# Patient Record
Sex: FEMALE | Race: BLACK OR AFRICAN AMERICAN | URBAN - METROPOLITAN AREA
[De-identification: names, ages, dates, MRNs, and addresses within clinical notes are randomized per-mention and may not be internally consistent; named-entity substitution may affect disease eponyms.]

---

## 2018-05-24 ENCOUNTER — TELEPHONE (OUTPATIENT)
Dept: OBGYN CLINIC | Facility: CLINIC | Age: 18
End: 2018-05-24

## 2018-05-24 NOTE — TELEPHONE ENCOUNTER
Patient is a new patient to us. I scheduled an appointment for June 11th. First Day of Last Period April 8th. She has a few questions concerning the pregnancy. Thank you.

## 2018-05-24 NOTE — TELEPHONE ENCOUNTER
Patient c/o episodes of nausea when eating. Patient encouraged to try small but frequent meals, avoid foods high in fat and spice food. Patient instructed to increase oral fluids as tolerated. Patient instructed to call office back if no improvement.  [place]

## 2018-06-11 ENCOUNTER — TELEPHONE (OUTPATIENT)
Dept: OBGYN CLINIC | Facility: CLINIC | Age: 18
End: 2018-06-11

## 2018-06-11 ENCOUNTER — OFFICE VISIT (OUTPATIENT)
Dept: OBGYN CLINIC | Facility: CLINIC | Age: 18
End: 2018-06-11

## 2018-06-11 ENCOUNTER — MED REC SCAN ONLY (OUTPATIENT)
Dept: OBGYN CLINIC | Facility: CLINIC | Age: 18
End: 2018-06-11

## 2018-06-11 ENCOUNTER — APPOINTMENT (OUTPATIENT)
Dept: OBGYN CLINIC | Facility: CLINIC | Age: 18
End: 2018-06-11

## 2018-06-11 VITALS — HEIGHT: 59 IN | DIASTOLIC BLOOD PRESSURE: 52 MMHG | SYSTOLIC BLOOD PRESSURE: 100 MMHG

## 2018-06-11 DIAGNOSIS — Z34.01 ENCOUNTER FOR SUPERVISION OF NORMAL FIRST PREGNANCY IN FIRST TRIMESTER: Primary | ICD-10-CM

## 2018-06-11 PROCEDURE — 87491 CHLMYD TRACH DNA AMP PROBE: CPT | Performed by: OBSTETRICS & GYNECOLOGY

## 2018-06-11 PROCEDURE — 87591 N.GONORRHOEAE DNA AMP PROB: CPT | Performed by: OBSTETRICS & GYNECOLOGY

## 2018-06-11 PROCEDURE — 81002 URINALYSIS NONAUTO W/O SCOPE: CPT | Performed by: OBSTETRICS & GYNECOLOGY

## 2018-06-11 PROCEDURE — 76801 OB US < 14 WKS SINGLE FETUS: CPT | Performed by: OBSTETRICS & GYNECOLOGY

## 2018-06-11 PROCEDURE — 80307 DRUG TEST PRSMV CHEM ANLYZR: CPT | Performed by: OBSTETRICS & GYNECOLOGY

## 2018-06-11 PROCEDURE — 87086 URINE CULTURE/COLONY COUNT: CPT | Performed by: OBSTETRICS & GYNECOLOGY

## 2018-06-11 NOTE — PROGRESS NOTES
Viable pregnancy at 9 weeks 0 days due date is January 14, 2019. Desires genetic testing. We will do nuchal translucency and Pap A. Blood work was discussed. 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 She does not smoke no alcohol no drugs.   She

## 2018-06-23 ENCOUNTER — TELEPHONE (OUTPATIENT)
Dept: OBGYN CLINIC | Facility: CLINIC | Age: 18
End: 2018-06-23

## 2018-06-23 NOTE — TELEPHONE ENCOUNTER
Referral for NT approved. Message send to  to schedule patient for [address] appointment.

## 2018-06-23 NOTE — TELEPHONE ENCOUNTER
Per pt she is experiencing nausea and is not able to eat a lot because of if. She would like some advise in what to do to help that situation. Please advise and call pt.  Thanks

## 2018-06-25 RX ORDER — DOXYLAMINE SUCCINATE AND PYRIDOXINE HYDROCHLORIDE, DELAYED RELEASE TABLETS 10 MG/10 MG 10; 10 MG/1; MG/1
2 TABLET, DELAYED RELEASE ORAL NIGHTLY
Qty: 60 TABLET | Refills: 1 | Status: SHIPPED | OUTPATIENT
Start: 2018-06-25 | End: 2018-07-13

## 2018-06-25 NOTE — TELEPHONE ENCOUNTER
Patient states that she has been experiencing nausea and she is always hungary. Patient instructed to ir small but more frequent meals, stay away from high fat, high sugar and spice food. Increase oral intake of oral fluids to stay hydrated.  Patient may tr

## 2018-06-28 DIAGNOSIS — Z36.82 ENCOUNTER FOR ANTENATAL SCREENING FOR NUCHAL TRANSLUCENCY: Primary | ICD-10-CM

## 2018-07-02 ENCOUNTER — APPOINTMENT (OUTPATIENT)
Dept: OBGYN CLINIC | Facility: CLINIC | Age: 18
End: 2018-07-02

## 2018-07-07 ENCOUNTER — TELEPHONE (OUTPATIENT)
Dept: OBGYN CLINIC | Facility: CLINIC | Age: 18
End: 2018-07-07

## 2018-07-13 ENCOUNTER — MED REC SCAN ONLY (OUTPATIENT)
Dept: OBGYN CLINIC | Facility: CLINIC | Age: 18
End: 2018-07-13

## 2018-07-13 ENCOUNTER — ROUTINE PRENATAL (OUTPATIENT)
Dept: OBGYN CLINIC | Facility: CLINIC | Age: 18
End: 2018-07-13

## 2018-07-13 VITALS
DIASTOLIC BLOOD PRESSURE: 62 MMHG | BODY MASS INDEX: 18.95 KG/M2 | SYSTOLIC BLOOD PRESSURE: 110 MMHG | HEIGHT: 59 IN | WEIGHT: 94 LBS

## 2018-07-13 DIAGNOSIS — Z34.01 ENCOUNTER FOR SUPERVISION OF NORMAL FIRST PREGNANCY IN FIRST TRIMESTER: Primary | ICD-10-CM

## 2018-07-13 RX ORDER — METOCLOPRAMIDE 10 MG/1
10 TABLET ORAL EVERY 6 HOURS PRN
Qty: 20 TABLET | Refills: 0 | Status: SHIPPED | OUTPATIENT
Start: 2018-07-13

## 2018-07-13 NOTE — PROGRESS NOTES
Complains of nausea and vomiting. Could not get the diclegis secondary to money. Was referred to [facility]. Reglan will be ordered.

## 2018-07-27 ENCOUNTER — PATIENT MESSAGE (OUTPATIENT)
Dept: OBGYN CLINIC | Facility: CLINIC | Age: 18
End: 2018-07-27

## 2018-07-27 NOTE — TELEPHONE ENCOUNTER
Spoke to patient, she states that she has cramping on and off, no vaginal bleeding, no abdominal pain. Patient schedule for appointment with Dr. Nuris Arias for evaluation.

## 2018-07-27 NOTE — TELEPHONE ENCOUNTER
From: Darrell Virgen  To: Bouchra Zamora MD  Sent: 7/27/2018 1:03 AM CDT  Subject: Polo Chiranulfo I Will Let You Know And I Dont Want To Keep Bothering You But Lately I Have Been Having Cramps None Stop And Haven’t Felt Anymore Flutters Lately It Just Have Me

## 2018-07-30 ENCOUNTER — TELEPHONE (OUTPATIENT)
Dept: OBGYN CLINIC | Facility: CLINIC | Age: 18
End: 2018-07-30

## 2018-07-30 NOTE — TELEPHONE ENCOUNTER
After hours phone call from patient regarding a message from Jessica from our office. Spoke to patient and inform her that there is no [place] working in our office, patient confirm that it might off been one of her friend calling her.

## 2018-08-29 ENCOUNTER — TELEPHONE (OUTPATIENT)
Dept: OBGYN | Age: 18
End: 2018-08-29

## 2018-09-04 ENCOUNTER — TELEPHONE (OUTPATIENT)
Dept: OBGYN CLINIC | Facility: CLINIC | Age: 18
End: 2018-09-04

## 2018-09-05 ENCOUNTER — TELEPHONE (OUTPATIENT)
Dept: OBGYN CLINIC | Facility: CLINIC | Age: 18
End: 2018-09-05

## 2018-09-05 NOTE — TELEPHONE ENCOUNTER
Call placed to patient, who states she already spoke to someone at the office and no longer needs our assistance.

## 2018-09-05 NOTE — TELEPHONE ENCOUNTER
Patient called stating someone is posing as her and wants to know if someone called recently faking their identity. Advised we are unaware of the situation. Patient states she is in the process of filing a police report, and is also moving.  Will be transfe